# Patient Record
Sex: FEMALE | Race: WHITE
[De-identification: names, ages, dates, MRNs, and addresses within clinical notes are randomized per-mention and may not be internally consistent; named-entity substitution may affect disease eponyms.]

---

## 2018-10-25 ENCOUNTER — HOSPITAL ENCOUNTER (OUTPATIENT)
Dept: HOSPITAL 96 - M.RAD | Age: 58
End: 2018-10-25
Attending: FAMILY MEDICINE
Payer: COMMERCIAL

## 2018-10-25 DIAGNOSIS — Z12.31: Primary | ICD-10-CM

## 2019-09-03 ENCOUNTER — HOSPITAL ENCOUNTER (OUTPATIENT)
Dept: HOSPITAL 96 - M.SUR | Age: 59
Setting detail: OBSERVATION
LOS: 1 days | Discharge: HOME | End: 2019-09-04
Attending: ORTHOPAEDIC SURGERY | Admitting: ORTHOPAEDIC SURGERY
Payer: COMMERCIAL

## 2019-09-03 VITALS — DIASTOLIC BLOOD PRESSURE: 46 MMHG | SYSTOLIC BLOOD PRESSURE: 133 MMHG

## 2019-09-03 VITALS — BODY MASS INDEX: 32.13 KG/M2 | WEIGHT: 212 LBS | HEIGHT: 67.99 IN
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
## 2019-09-03 DIAGNOSIS — F41.1: ICD-10-CM
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
## 2019-09-03 DIAGNOSIS — Z79.899: ICD-10-CM

## 2019-09-03 DIAGNOSIS — F32.9: ICD-10-CM

## 2019-09-03 DIAGNOSIS — S42.202A: Primary | ICD-10-CM

## 2019-09-04 VITALS — DIASTOLIC BLOOD PRESSURE: 82 MMHG | SYSTOLIC BLOOD PRESSURE: 157 MMHG

## 2019-09-04 VITALS — DIASTOLIC BLOOD PRESSURE: 71 MMHG | SYSTOLIC BLOOD PRESSURE: 138 MMHG

## 2019-09-04 VITALS — DIASTOLIC BLOOD PRESSURE: 87 MMHG | SYSTOLIC BLOOD PRESSURE: 140 MMHG

## 2019-09-04 VITALS — SYSTOLIC BLOOD PRESSURE: 157 MMHG | DIASTOLIC BLOOD PRESSURE: 82 MMHG

## 2020-08-10 ENCOUNTER — HOSPITAL ENCOUNTER (OUTPATIENT)
Dept: HOSPITAL 96 - M.RAD | Age: 60
End: 2020-08-10
Attending: FAMILY MEDICINE
Payer: COMMERCIAL

## 2020-08-10 DIAGNOSIS — Z12.31: Primary | ICD-10-CM

## 2020-08-10 DIAGNOSIS — N64.89: ICD-10-CM

## 2020-08-12 ENCOUNTER — HOSPITAL ENCOUNTER (OUTPATIENT)
Dept: HOSPITAL 96 - M.ULTRA | Age: 60
End: 2020-08-12
Attending: FAMILY MEDICINE
Payer: COMMERCIAL

## 2020-08-12 DIAGNOSIS — N63.22: Primary | ICD-10-CM

## 2020-08-18 ENCOUNTER — HOSPITAL ENCOUNTER (OUTPATIENT)
Dept: HOSPITAL 96 - M.ULTRA | Age: 60
End: 2020-08-18
Attending: FAMILY MEDICINE
Payer: COMMERCIAL

## 2020-08-18 DIAGNOSIS — N63.22: Primary | ICD-10-CM

## 2021-10-29 ENCOUNTER — HOSPITAL ENCOUNTER (INPATIENT)
Dept: HOSPITAL 96 - M.ERS | Age: 61
LOS: 5 days | Discharge: HOME | DRG: 208 | End: 2021-11-03
Attending: INTERNAL MEDICINE | Admitting: INTERNAL MEDICINE
Payer: COMMERCIAL

## 2021-10-29 VITALS — DIASTOLIC BLOOD PRESSURE: 72 MMHG | SYSTOLIC BLOOD PRESSURE: 134 MMHG

## 2021-10-29 VITALS — DIASTOLIC BLOOD PRESSURE: 76 MMHG | SYSTOLIC BLOOD PRESSURE: 127 MMHG

## 2021-10-29 VITALS — SYSTOLIC BLOOD PRESSURE: 171 MMHG | DIASTOLIC BLOOD PRESSURE: 81 MMHG

## 2021-10-29 VITALS — WEIGHT: 232 LBS | BODY MASS INDEX: 37.28 KG/M2 | HEIGHT: 65.98 IN

## 2021-10-29 VITALS — DIASTOLIC BLOOD PRESSURE: 84 MMHG | SYSTOLIC BLOOD PRESSURE: 142 MMHG

## 2021-10-29 DIAGNOSIS — Z20.822: ICD-10-CM

## 2021-10-29 DIAGNOSIS — F32.9: ICD-10-CM

## 2021-10-29 DIAGNOSIS — T78.3XXA: ICD-10-CM

## 2021-10-29 DIAGNOSIS — J96.01: Primary | ICD-10-CM

## 2021-10-29 DIAGNOSIS — Z85.3: ICD-10-CM

## 2021-10-29 DIAGNOSIS — Z79.899: ICD-10-CM

## 2021-10-29 DIAGNOSIS — F41.1: ICD-10-CM

## 2021-10-29 DIAGNOSIS — T78.40XA: ICD-10-CM

## 2021-10-29 DIAGNOSIS — R73.9: ICD-10-CM

## 2021-10-29 LAB
ABSOLUTE MONOCYTES: 0.3 THOU/UL (ref 0–1.2)
ANION GAP SERPL CALC-SCNC: 9 MMOL/L (ref 7–16)
BE: -5.7 MMOL/L
BUN SERPL-MCNC: 14 MG/DL (ref 7–18)
CALCIUM SERPL-MCNC: 8.3 MG/DL (ref 8.5–10.1)
CHLORIDE SERPL-SCNC: 103 MMOL/L (ref 98–107)
CO2 SERPL-SCNC: 23 MMOL/L (ref 21–32)
CREAT SERPL-MCNC: 0.8 MG/DL (ref 0.6–1.3)
GLUCOSE SERPL-MCNC: 150 MG/DL (ref 70–99)
GRANULOCYTES NFR BLD MANUAL: 86 %
HCT VFR BLD CALC: 41.1 % (ref 37–47)
HGB BLD-MCNC: 13.6 GM/DL (ref 12–15)
LYMPHOCYTES # BLD: 0.9 THOU/UL (ref 0.8–5.3)
LYMPHOCYTES NFR BLD AUTO: 6 %
MCH RBC QN AUTO: 28.3 PG (ref 26–34)
MCHC RBC AUTO-ENTMCNC: 33 G/DL (ref 28–37)
MCV RBC: 85.9 FL (ref 80–100)
MONOCYTES NFR BLD: 3 %
MPV: 7.7 FL. (ref 7.2–11.1)
NEUTROPHILS # BLD: 9.9 THOU/UL (ref 1.6–8.1)
NEUTS BAND NFR BLD: 3 %
NUCLEATED RBCS: 0 /100WBC
PCO2 BLD: 32.5 MMHG (ref 35–45)
PLATELET # BLD EST: ADEQUATE 10*3/UL
PLATELET COUNT*: 342 THOU/UL (ref 150–400)
PO2 BLD: 135 MMHG (ref 75–100)
POTASSIUM SERPL-SCNC: 3.8 MMOL/L (ref 3.5–5.1)
RBC # BLD AUTO: 4.78 MIL/UL (ref 4.2–5)
RBC MORPH BLD: NORMAL
RDW-CV: 14.9 % (ref 10.5–14.5)
SODIUM SERPL-SCNC: 135 MMOL/L (ref 136–145)
VARIANT LYMPHS NFR BLD MANUAL: 2 %
WBC # BLD AUTO: 11.1 THOU/UL (ref 4–11)

## 2021-10-29 PROCEDURE — 0BH17EZ INSERTION OF ENDOTRACHEAL AIRWAY INTO TRACHEA, VIA NATURAL OR ARTIFICIAL OPENING: ICD-10-PCS | Performed by: INTERNAL MEDICINE

## 2021-10-29 PROCEDURE — 5A1945Z RESPIRATORY VENTILATION, 24-96 CONSECUTIVE HOURS: ICD-10-PCS | Performed by: INTERNAL MEDICINE

## 2021-10-30 VITALS — SYSTOLIC BLOOD PRESSURE: 131 MMHG | DIASTOLIC BLOOD PRESSURE: 79 MMHG

## 2021-10-30 VITALS — DIASTOLIC BLOOD PRESSURE: 56 MMHG | SYSTOLIC BLOOD PRESSURE: 105 MMHG

## 2021-10-30 VITALS — SYSTOLIC BLOOD PRESSURE: 123 MMHG | DIASTOLIC BLOOD PRESSURE: 65 MMHG

## 2021-10-30 VITALS — SYSTOLIC BLOOD PRESSURE: 145 MMHG | DIASTOLIC BLOOD PRESSURE: 75 MMHG

## 2021-10-30 VITALS — DIASTOLIC BLOOD PRESSURE: 80 MMHG | SYSTOLIC BLOOD PRESSURE: 134 MMHG

## 2021-10-30 VITALS — DIASTOLIC BLOOD PRESSURE: 67 MMHG | SYSTOLIC BLOOD PRESSURE: 119 MMHG

## 2021-10-30 VITALS — DIASTOLIC BLOOD PRESSURE: 73 MMHG | SYSTOLIC BLOOD PRESSURE: 135 MMHG

## 2021-10-30 VITALS — DIASTOLIC BLOOD PRESSURE: 65 MMHG | SYSTOLIC BLOOD PRESSURE: 122 MMHG

## 2021-10-30 VITALS — DIASTOLIC BLOOD PRESSURE: 79 MMHG | SYSTOLIC BLOOD PRESSURE: 127 MMHG

## 2021-10-30 VITALS — DIASTOLIC BLOOD PRESSURE: 70 MMHG | SYSTOLIC BLOOD PRESSURE: 130 MMHG

## 2021-10-30 VITALS — DIASTOLIC BLOOD PRESSURE: 76 MMHG | SYSTOLIC BLOOD PRESSURE: 142 MMHG

## 2021-10-30 VITALS — DIASTOLIC BLOOD PRESSURE: 64 MMHG | SYSTOLIC BLOOD PRESSURE: 113 MMHG

## 2021-10-30 VITALS — SYSTOLIC BLOOD PRESSURE: 141 MMHG | DIASTOLIC BLOOD PRESSURE: 83 MMHG

## 2021-10-30 VITALS — DIASTOLIC BLOOD PRESSURE: 72 MMHG | SYSTOLIC BLOOD PRESSURE: 129 MMHG

## 2021-10-30 VITALS — SYSTOLIC BLOOD PRESSURE: 109 MMHG | DIASTOLIC BLOOD PRESSURE: 54 MMHG

## 2021-10-30 VITALS — SYSTOLIC BLOOD PRESSURE: 137 MMHG | DIASTOLIC BLOOD PRESSURE: 69 MMHG

## 2021-10-30 VITALS — SYSTOLIC BLOOD PRESSURE: 129 MMHG | DIASTOLIC BLOOD PRESSURE: 65 MMHG

## 2021-10-30 VITALS — DIASTOLIC BLOOD PRESSURE: 69 MMHG | SYSTOLIC BLOOD PRESSURE: 122 MMHG

## 2021-10-30 VITALS — DIASTOLIC BLOOD PRESSURE: 77 MMHG | SYSTOLIC BLOOD PRESSURE: 139 MMHG

## 2021-10-30 VITALS — SYSTOLIC BLOOD PRESSURE: 133 MMHG | DIASTOLIC BLOOD PRESSURE: 79 MMHG

## 2021-10-30 VITALS — SYSTOLIC BLOOD PRESSURE: 129 MMHG | DIASTOLIC BLOOD PRESSURE: 69 MMHG

## 2021-10-30 VITALS — DIASTOLIC BLOOD PRESSURE: 77 MMHG | SYSTOLIC BLOOD PRESSURE: 131 MMHG

## 2021-10-30 VITALS — SYSTOLIC BLOOD PRESSURE: 118 MMHG | DIASTOLIC BLOOD PRESSURE: 63 MMHG

## 2021-10-30 VITALS — SYSTOLIC BLOOD PRESSURE: 139 MMHG | DIASTOLIC BLOOD PRESSURE: 80 MMHG

## 2021-10-30 LAB
BE: -1.9 MMOL/L
PCO2 BLD: 37.6 MMHG (ref 35–45)
PO2 BLD: 96 MMHG (ref 75–100)

## 2021-10-30 NOTE — CON
[address]  [address]                    CONSULTATION                  
_______________________________________________________________________________
 
Name:       DEE ENGLISH                Room:           [address] [place]  
M.R.#:  [id number]      Account #:      [id number]  
Admission:  10/29/21     Attend Phys:    Abhay Cartagena MD 
Discharge:               Date of Birth:  [birth year]  
         Report #: [id number]
                                                                     [id number]
_______________________________________________________________________________
THIS REPORT FOR:  
 
cc:  Alesia Collins Maggie M. DO Pervez, Adeel MD                                                   ~
 
 
DATE OF CONSULTATION: 10/30/2021
 
REQUESTING PHYSICIAN:  Consult has been requested by Dr. Brar.
 
INDICATION FOR CONSULTATION:  Ventilator management.
 
HISTORY OF PRESENT ILLNESS:  A 61-year-old female has a history of breast 
cancer.  She also has had significant anxiety and depression in the past and 
significant pain issues with left shoulder and humerus injuries; however, the 
patient is not reported to be currently on narcotics.  The patient does not have
a history of longstanding cardiac or respiratory disease.  There is no known use
of ACE inhibitors.  The patient yesterday was having dinner, was eating [employer] 
[employer]  with French fries and was also having a beer, which she was sharing
with her .  She has had the same on many occasions previously according 
to the  with no adverse reactions. At this time, the patient's  
reports that her tongue suddenly started to swell up and she became suddenly 
very short of breath as a result. The patient eventually was endotracheally 
intubated for airway protection.  There is no definite history of aspiration 
noted.
 
Currently, the patient is stable on the ventilator, 35% FiO2, 5 of PEEP.  She is
sedated with 50 of propofol as well as 4 of Versed and she still does not appear
to be adequately sedated, though. She is maintaining her normal blood pressure, 
but she is tachycardic.  Her T-max is normal at 37.0.  The patient is on the 
ventilator and therefore she is not able to provide a further history or review 
of systems.
 
PAST MEDICAL HISTORY:  Breast cancer surgery over a year ago in the left breast.
 The patient's  states that his impression was that this was stage 1.  
There were some lymph nodes sampled. It is not known to me as to whether there 
was any spread or not, longstanding pain issues with a history of left shoulder 
and left humerus injury, has used narcotics for pain in the past, but is not 
reported to be currently on any narcotics, generalized anxiety disorder, and 
major depressive disorder.
 
SOCIAL HISTORY:  She has smoked in the past for about 15 years, discontinued 
many years ago.  Only occasional alcohol use.
 
IMMUNIZATION HISTORY:  She received 2 doses of Moderna COVID-19 vaccine a few 
 
 
 
[place] [address]                    CONSULTATION                  
_______________________________________________________________________________
 
Name:       AMADORDEE                Room:           [address] [place]  
..#:  [id number]      Account #:      [id number]  
Admission:  10/29/21     Attend Phys:    Abhay Cartagena MD 
Discharge:               Date of Birth:  [birth year]  
         Report #: [id number]
                                                                     [id number]
_______________________________________________________________________________
 
 
months ago.
 
ALLERGIES:  No known drug allergies.
 
FAMILY HISTORY:  No pertinent family history.
 
CURRENT MEDICATIONS:  The list in [facility] reviewed.
 
HOME MEDICATIONS:  Also the list in [facility] reviewed.
 
PHYSICAL EXAMINATION:
GENERAL:  She is sedated with propofol and Versed, still appears to be restless.
 She is tachycardic.
VITAL SIGNS:  Heart rate is 115, blood pressure 140/80, temperature 37.0, 
saturating 97%, tidal volume 500, AC rate of 14.  She is overbreathing with the 
ventilator, 35% FiO2, 5 of PEEP.
HEENT:  Head is normocephalic and atraumatic.  There is an endotracheal tube in 
good position.  There is some swelling of her tongue and mouth noted.  It is, 
however, difficult to fully assess due to the presence of the endotracheal tube.
NECK:  Does not show raised JVP, asymmetry, mass or lymph nodes.  It is 
difficult to assess as to whether there is any neck swelling.
CHEST:  Breath sounds are bilaterally equal.  I do not hear any added sounds.
HEART:  Regular.  There is tachycardia noted.
ABDOMEN:  Soft and nontender.
EXTREMITIES:  Lower extremities show no edema and no calf tenderness.
SKIN:  Dry and intact.
NEUROLOGIC:  She moves all extremities bilaterally equally and spontaneously 
with no focal deficit identified.
 
DIAGNOSTIC DATA:  The patient's chest x-ray reviewed.  There is a radiopaque 
density at the left lung base, which could be chronic change, infiltrate or 
atelectasis.  I do not have a previous chest x-ray available to compare; 
therefore, I would for now treat this as an infiltrate.  The patient's lab work 
is in [facility].  This is reviewed.  The arterial blood gases also in [facility] 
and this is also reviewed.
 
ASSESSMENT AND PLAN:
1.  Acute respiratory failure secondary to upper airway edema.  I would be 
inclined to wait at least 48 hours before doing a leak test and seeing as to 
whether we are able to take her off the ventilator.  In the meantime, I 
recommend that we continue with propofol.  We will follow propofol related labs,
okay to go up to 80 on the propofol for a short period of time, but for a longer
period of time, we will try to limit at 50 or less. For now, we will also 
continue with the Versed drip, but I will go ahead and start a fentanyl drip and
 
 
 
[place]  [address]                    CONSULTATION                  
_______________________________________________________________________________
 
Name:       DEE ENGLISH                Room:           [address] [place]  
M.R.#:  [id number]      Account #:      [id number]  
Admission:  10/29/21     Attend Phys:    Abhay Cartagena MD 
Discharge:               Date of Birth:  [birth year]  
         Report #: [id number]
                                                                     [id number]
_______________________________________________________________________________
 
 
then we will see if we can bring the Versed drip down.  She is tachycardic.  We 
will watch this closely.  Tachycardia, likely is due to restlessness/agitation. 
The patient also has had longstanding pain issues as above.
2.  Tongue swelling/upper airway edema. I started with high-dose Solu-Medrol, 
Benadryl as well as Pepcid.  We will follow response.  We will verify that she 
passes a leak test whenever we do try to extubate her later.
3.  Pulmonary infiltrates.  There is an infiltrate at the left lung base, 
chronic changes and atelectasis can also lead to this picture; however, I do not
have a previous chest x-ray available to compare; therefore, I would cover with 
ceftriaxone.
4.  History of breast cancer.
5.  History of longstanding left shoulder pain, not known to be currently on 
narcotics.
6.  Mild hyperglycemia.  We will put her on insulin sliding scale while she is 
on high dose steroid.
7.  Deep vein thrombosis prophylaxis.  Agree with Lovenox.
8.  Gastrointestinal prophylaxis. She is on Pepcid as above.
9.  Clostridium difficile prophylaxis.  We will order Lactinex.
10.  Nutrition.  I expect her to be on the ventilator a couple of days.  
Therefore, I will go ahead and start tube feeds.  We will discontinue IV fluids 
when tube feeds started.
 
The patient is critically ill at this time.
 
Total time spent providing critical care to this patient today exceeds 40 
minutes.
 
 
 
 
 
 
 
 
 
 
 
 
 
 
 
 
<ELECTRONICALLY SIGNED>
                                        By:  Chuck Hopper MD              
10/30/21     1852
D: 10/30/21 1146_______________________________________
T: 10/30/21 1416Ajose Hopper MD                 /nt

## 2021-10-30 NOTE — NUR
1140 DR. JIN ROUNDED. SEDATION LEVEL AND POC DISCUSSED. NO CHNAGES TO BE
MADE TODAY, JUST CONTINUE TO MONITOR AND ALLOW THE SWELLING TO COME DOWN.

## 2021-10-31 VITALS — SYSTOLIC BLOOD PRESSURE: 94 MMHG | DIASTOLIC BLOOD PRESSURE: 46 MMHG

## 2021-10-31 VITALS — SYSTOLIC BLOOD PRESSURE: 92 MMHG | DIASTOLIC BLOOD PRESSURE: 51 MMHG

## 2021-10-31 VITALS — SYSTOLIC BLOOD PRESSURE: 84 MMHG | DIASTOLIC BLOOD PRESSURE: 40 MMHG

## 2021-10-31 VITALS — SYSTOLIC BLOOD PRESSURE: 106 MMHG | DIASTOLIC BLOOD PRESSURE: 57 MMHG

## 2021-10-31 VITALS — SYSTOLIC BLOOD PRESSURE: 105 MMHG | DIASTOLIC BLOOD PRESSURE: 52 MMHG

## 2021-10-31 VITALS — DIASTOLIC BLOOD PRESSURE: 50 MMHG | SYSTOLIC BLOOD PRESSURE: 105 MMHG

## 2021-10-31 VITALS — DIASTOLIC BLOOD PRESSURE: 53 MMHG | SYSTOLIC BLOOD PRESSURE: 99 MMHG

## 2021-10-31 VITALS — SYSTOLIC BLOOD PRESSURE: 88 MMHG | DIASTOLIC BLOOD PRESSURE: 42 MMHG

## 2021-10-31 VITALS — SYSTOLIC BLOOD PRESSURE: 104 MMHG | DIASTOLIC BLOOD PRESSURE: 49 MMHG

## 2021-10-31 VITALS — SYSTOLIC BLOOD PRESSURE: 108 MMHG | DIASTOLIC BLOOD PRESSURE: 56 MMHG

## 2021-10-31 VITALS — SYSTOLIC BLOOD PRESSURE: 86 MMHG | DIASTOLIC BLOOD PRESSURE: 46 MMHG

## 2021-10-31 VITALS — SYSTOLIC BLOOD PRESSURE: 102 MMHG | DIASTOLIC BLOOD PRESSURE: 48 MMHG

## 2021-10-31 VITALS — DIASTOLIC BLOOD PRESSURE: 47 MMHG | SYSTOLIC BLOOD PRESSURE: 93 MMHG

## 2021-10-31 VITALS — DIASTOLIC BLOOD PRESSURE: 74 MMHG | SYSTOLIC BLOOD PRESSURE: 121 MMHG

## 2021-10-31 VITALS — DIASTOLIC BLOOD PRESSURE: 50 MMHG | SYSTOLIC BLOOD PRESSURE: 98 MMHG

## 2021-10-31 VITALS — DIASTOLIC BLOOD PRESSURE: 55 MMHG | SYSTOLIC BLOOD PRESSURE: 115 MMHG

## 2021-10-31 VITALS — DIASTOLIC BLOOD PRESSURE: 53 MMHG | SYSTOLIC BLOOD PRESSURE: 107 MMHG

## 2021-10-31 VITALS — DIASTOLIC BLOOD PRESSURE: 59 MMHG | SYSTOLIC BLOOD PRESSURE: 124 MMHG

## 2021-10-31 VITALS — SYSTOLIC BLOOD PRESSURE: 103 MMHG | DIASTOLIC BLOOD PRESSURE: 52 MMHG

## 2021-10-31 VITALS — SYSTOLIC BLOOD PRESSURE: 113 MMHG | DIASTOLIC BLOOD PRESSURE: 54 MMHG

## 2021-10-31 VITALS — DIASTOLIC BLOOD PRESSURE: 46 MMHG | SYSTOLIC BLOOD PRESSURE: 89 MMHG

## 2021-10-31 VITALS — SYSTOLIC BLOOD PRESSURE: 97 MMHG | DIASTOLIC BLOOD PRESSURE: 50 MMHG

## 2021-10-31 VITALS — DIASTOLIC BLOOD PRESSURE: 48 MMHG | SYSTOLIC BLOOD PRESSURE: 96 MMHG

## 2021-10-31 VITALS — SYSTOLIC BLOOD PRESSURE: 101 MMHG | DIASTOLIC BLOOD PRESSURE: 55 MMHG

## 2021-10-31 VITALS — SYSTOLIC BLOOD PRESSURE: 102 MMHG | DIASTOLIC BLOOD PRESSURE: 54 MMHG

## 2021-10-31 VITALS — DIASTOLIC BLOOD PRESSURE: 46 MMHG | SYSTOLIC BLOOD PRESSURE: 103 MMHG

## 2021-10-31 VITALS — DIASTOLIC BLOOD PRESSURE: 41 MMHG | SYSTOLIC BLOOD PRESSURE: 85 MMHG

## 2021-10-31 VITALS — DIASTOLIC BLOOD PRESSURE: 47 MMHG | SYSTOLIC BLOOD PRESSURE: 96 MMHG

## 2021-10-31 LAB
ABSOLUTE BASOPHILS: 0 THOU/UL (ref 0–0.2)
ABSOLUTE EOSINOPHILS: 0 THOU/UL (ref 0–0.7)
ABSOLUTE MONOCYTES: 0.4 THOU/UL (ref 0–1.2)
ALBUMIN SERPL-MCNC: 2.7 G/DL (ref 3.4–5)
ALP SERPL-CCNC: 70 U/L (ref 46–116)
ALT SERPL-CCNC: 16 U/L (ref 30–65)
ANION GAP SERPL CALC-SCNC: 7 MMOL/L (ref 7–16)
AST SERPL-CCNC: 7 U/L (ref 15–37)
BASOPHILS NFR BLD AUTO: 0.2 %
BILIRUB SERPL-MCNC: 0.1 MG/DL
BUN SERPL-MCNC: 13 MG/DL (ref 7–18)
CALCIUM SERPL-MCNC: 8.5 MG/DL (ref 8.5–10.1)
CHLORIDE SERPL-SCNC: 105 MMOL/L (ref 98–107)
CO2 SERPL-SCNC: 26 MMOL/L (ref 21–32)
CREAT SERPL-MCNC: 0.7 MG/DL (ref 0.6–1.3)
EOSINOPHIL NFR BLD: 0 %
GLUCOSE SERPL-MCNC: 185 MG/DL (ref 70–99)
GRANULOCYTES NFR BLD MANUAL: 93.1 %
HCT VFR BLD CALC: 37.6 % (ref 37–47)
HGB BLD-MCNC: 12.1 GM/DL (ref 12–15)
LIPASE: 103 U/L (ref 73–393)
LYMPHOCYTES # BLD: 0.6 THOU/UL (ref 0.8–5.3)
LYMPHOCYTES NFR BLD AUTO: 4.1 %
MAGNESIUM SERPL-MCNC: 2.5 MG/DL (ref 1.8–2.4)
MCH RBC QN AUTO: 28.1 PG (ref 26–34)
MCHC RBC AUTO-ENTMCNC: 32.2 G/DL (ref 28–37)
MCV RBC: 87.3 FL (ref 80–100)
MONOCYTES NFR BLD: 2.6 %
MPV: 7.8 FL. (ref 7.2–11.1)
NEUTROPHILS # BLD: 14.3 THOU/UL (ref 1.6–8.1)
NUCLEATED RBCS: 0 /100WBC
PHOSPHATE SERPL-MCNC: 2.7 MG/DL (ref 2.5–4.9)
PLATELET COUNT*: 321 THOU/UL (ref 150–400)
POTASSIUM SERPL-SCNC: 4.6 MMOL/L (ref 3.5–5.1)
PROT SERPL-MCNC: 6.7 G/DL (ref 6.4–8.2)
RBC # BLD AUTO: 4.31 MIL/UL (ref 4.2–5)
RDW-CV: 15.1 % (ref 10.5–14.5)
SODIUM SERPL-SCNC: 138 MMOL/L (ref 136–145)
TRIGL SERPL-MCNC: 86 MG/DL (ref ?–150)
WBC # BLD AUTO: 15.3 THOU/UL (ref 4–11)

## 2021-10-31 NOTE — EKG
[place] [address]
Phone:  [phone]                     ELECTROCARDIOGRAM REPORT      
_______________________________________________________________________________
 
Name:         AMADORDEE               Room:          [address]    [place] 
M.R.#:    [id number]     Account #:     [id number]  
Admission:    10/29/21    Attend Phys:   Abhay Cartagena, 
Discharge:                Date of Birth: [birth year]  
Date of Service: 10/31/21 0455  Report #:      [id number]
        [id number]
_______________________________________________________________________________
THIS REPORT FOR:  //name//                      
 
                          [facility]
                                       
Test Date:    2021-10-31               Test Time:    04:55:40
Pat Name:     DEE ENGLISH            Department:   
Patient ID:   [id number]            Room:         [address]
Gender:       F                        Technician:   
:          1960               Requested By: Abhay Cartagena
Order Number: [id number]    Alcides MD:   Ignacio Grayson
                                 Measurements
Intervals                              Axis          
Rate:         90                       P:            67
MI:           147                      QRS:          71
QRSD:         92                       T:            53
QT:           375                                    
QTc:          459                                    
                           Interpretive Statements
Sinus rhythm
Baseline wander in lead(s) V1
No previous ECG available for comparison
Electronically Signed On 10- 12:12:55 CDT by Ignacio Grayson
[email or web address]
 
 
 
 
 
 
 
 
 
 
 
 
 
 
 
 
 
 
 
 
 
  <ELECTRONICALLY SIGNED>
                                           By: Ignacio Grayson MD, FACC   
  10/31/21     1212
D: 10/31/21 0455   _____________________________________
T: 10/31/21 0455   Ignacio Grayson MD, FACC     /EPI

## 2021-10-31 NOTE — NUR
PT REMAINED SUCCESFULLY SEDATED THROUGHOUT SHIFT. VENT AND OG INTACT. O2
MAINTAINTED. SR ON MONITOR. CELLULITIS NOTED ON LEFT BREAST AND ABOVE LEFT
SHOULDER INCISION. MARKED OUTLINE OF REDNESS WITH PERMANENT MARKER. PT
CONTINUED TO BE SEDATED THROUGHOUT BATH. NO BM THIS SHIFT. PASSING GAS. AMAN
URINE OUTPUT IN HUNT. SHOWED CELLULITIS AREAS TO DAY RN.

## 2021-10-31 NOTE — NUR
DR. JIN ROUNDED ABOUT 1100. IT WAS DECIDED TO LEAVE THE PT INTUBATED FOR
ONE MORE DAY AND TO DO A LEAK TEST TO TEST FOR EXTUBATION READINESS TOMORROW.

## 2021-11-01 VITALS — DIASTOLIC BLOOD PRESSURE: 51 MMHG | SYSTOLIC BLOOD PRESSURE: 119 MMHG

## 2021-11-01 VITALS — SYSTOLIC BLOOD PRESSURE: 115 MMHG | DIASTOLIC BLOOD PRESSURE: 59 MMHG

## 2021-11-01 VITALS — DIASTOLIC BLOOD PRESSURE: 60 MMHG | SYSTOLIC BLOOD PRESSURE: 141 MMHG

## 2021-11-01 VITALS — DIASTOLIC BLOOD PRESSURE: 50 MMHG | SYSTOLIC BLOOD PRESSURE: 102 MMHG

## 2021-11-01 VITALS — SYSTOLIC BLOOD PRESSURE: 145 MMHG | DIASTOLIC BLOOD PRESSURE: 66 MMHG

## 2021-11-01 VITALS — DIASTOLIC BLOOD PRESSURE: 55 MMHG | SYSTOLIC BLOOD PRESSURE: 105 MMHG

## 2021-11-01 VITALS — SYSTOLIC BLOOD PRESSURE: 117 MMHG | DIASTOLIC BLOOD PRESSURE: 53 MMHG

## 2021-11-01 VITALS — SYSTOLIC BLOOD PRESSURE: 119 MMHG | DIASTOLIC BLOOD PRESSURE: 55 MMHG

## 2021-11-01 VITALS — DIASTOLIC BLOOD PRESSURE: 50 MMHG | SYSTOLIC BLOOD PRESSURE: 100 MMHG

## 2021-11-01 VITALS — DIASTOLIC BLOOD PRESSURE: 63 MMHG | SYSTOLIC BLOOD PRESSURE: 124 MMHG

## 2021-11-01 VITALS — SYSTOLIC BLOOD PRESSURE: 118 MMHG | DIASTOLIC BLOOD PRESSURE: 53 MMHG

## 2021-11-01 VITALS — SYSTOLIC BLOOD PRESSURE: 129 MMHG | DIASTOLIC BLOOD PRESSURE: 60 MMHG

## 2021-11-01 VITALS — SYSTOLIC BLOOD PRESSURE: 136 MMHG | DIASTOLIC BLOOD PRESSURE: 65 MMHG

## 2021-11-01 VITALS — DIASTOLIC BLOOD PRESSURE: 63 MMHG | SYSTOLIC BLOOD PRESSURE: 137 MMHG

## 2021-11-01 VITALS — SYSTOLIC BLOOD PRESSURE: 104 MMHG | DIASTOLIC BLOOD PRESSURE: 48 MMHG

## 2021-11-01 VITALS — SYSTOLIC BLOOD PRESSURE: 121 MMHG | DIASTOLIC BLOOD PRESSURE: 59 MMHG

## 2021-11-01 VITALS — DIASTOLIC BLOOD PRESSURE: 61 MMHG | SYSTOLIC BLOOD PRESSURE: 121 MMHG

## 2021-11-01 VITALS — SYSTOLIC BLOOD PRESSURE: 118 MMHG | DIASTOLIC BLOOD PRESSURE: 56 MMHG

## 2021-11-01 VITALS — SYSTOLIC BLOOD PRESSURE: 135 MMHG | DIASTOLIC BLOOD PRESSURE: 61 MMHG

## 2021-11-01 VITALS — DIASTOLIC BLOOD PRESSURE: 47 MMHG | SYSTOLIC BLOOD PRESSURE: 105 MMHG

## 2021-11-01 LAB
ABSOLUTE BASOPHILS: 0 THOU/UL (ref 0–0.2)
ABSOLUTE EOSINOPHILS: 0 THOU/UL (ref 0–0.7)
ABSOLUTE MONOCYTES: 0.5 THOU/UL (ref 0–1.2)
ANION GAP SERPL CALC-SCNC: 7 MMOL/L (ref 7–16)
BASOPHILS NFR BLD AUTO: 0 %
BUN SERPL-MCNC: 20 MG/DL (ref 7–18)
CALCIUM SERPL-MCNC: 8.5 MG/DL (ref 8.5–10.1)
CHLORIDE SERPL-SCNC: 104 MMOL/L (ref 98–107)
CO2 SERPL-SCNC: 28 MMOL/L (ref 21–32)
CREAT SERPL-MCNC: 0.8 MG/DL (ref 0.6–1.3)
EOSINOPHIL NFR BLD: 0 %
GLUCOSE SERPL-MCNC: 142 MG/DL (ref 70–99)
GRANULOCYTES NFR BLD MANUAL: 93.4 %
HCT VFR BLD CALC: 36.7 % (ref 37–47)
HGB BLD-MCNC: 11.9 GM/DL (ref 12–15)
LYMPHOCYTES # BLD: 0.5 THOU/UL (ref 0.8–5.3)
LYMPHOCYTES NFR BLD AUTO: 3.4 %
MAGNESIUM SERPL-MCNC: 2.6 MG/DL (ref 1.8–2.4)
MCH RBC QN AUTO: 28.3 PG (ref 26–34)
MCHC RBC AUTO-ENTMCNC: 32.5 G/DL (ref 28–37)
MCV RBC: 87.1 FL (ref 80–100)
MONOCYTES NFR BLD: 3.2 %
MPV: 8.1 FL. (ref 7.2–11.1)
NEUTROPHILS # BLD: 14.7 THOU/UL (ref 1.6–8.1)
NUCLEATED RBCS: 0 /100WBC
PLATELET COUNT*: 292 THOU/UL (ref 150–400)
POTASSIUM SERPL-SCNC: 4.8 MMOL/L (ref 3.5–5.1)
RBC # BLD AUTO: 4.21 MIL/UL (ref 4.2–5)
RDW-CV: 15.3 % (ref 10.5–14.5)
SODIUM SERPL-SCNC: 139 MMOL/L (ref 136–145)
WBC # BLD AUTO: 15.7 THOU/UL (ref 4–11)

## 2021-11-01 NOTE — NUR
Case Management Followup
 Consultation with providers indicated pt is not yet medically clear to
discharge. Pt is on a vent, but may be extubated today. Pt to undergo swallow
evaluation. Continue plan per physician orders. CM to continue to follow pt
for discharge planning.

## 2021-11-01 NOTE — NUR
CM Assessment  - Pending
 
CM unable to conduct assessment with pt as she is intubated. CM attempted to
conduct assessment with authorized contact, but was not able to reach him. CM
to attempt at later time.

## 2021-11-02 VITALS — DIASTOLIC BLOOD PRESSURE: 61 MMHG | SYSTOLIC BLOOD PRESSURE: 115 MMHG

## 2021-11-02 VITALS — SYSTOLIC BLOOD PRESSURE: 144 MMHG | DIASTOLIC BLOOD PRESSURE: 74 MMHG

## 2021-11-02 VITALS — SYSTOLIC BLOOD PRESSURE: 129 MMHG | DIASTOLIC BLOOD PRESSURE: 64 MMHG

## 2021-11-02 VITALS — DIASTOLIC BLOOD PRESSURE: 74 MMHG | SYSTOLIC BLOOD PRESSURE: 151 MMHG

## 2021-11-02 VITALS — DIASTOLIC BLOOD PRESSURE: 60 MMHG | SYSTOLIC BLOOD PRESSURE: 121 MMHG

## 2021-11-02 VITALS — DIASTOLIC BLOOD PRESSURE: 65 MMHG | SYSTOLIC BLOOD PRESSURE: 135 MMHG

## 2021-11-02 VITALS — SYSTOLIC BLOOD PRESSURE: 124 MMHG | DIASTOLIC BLOOD PRESSURE: 74 MMHG

## 2021-11-02 VITALS — DIASTOLIC BLOOD PRESSURE: 61 MMHG | SYSTOLIC BLOOD PRESSURE: 138 MMHG

## 2021-11-02 VITALS — SYSTOLIC BLOOD PRESSURE: 133 MMHG | DIASTOLIC BLOOD PRESSURE: 64 MMHG

## 2021-11-02 VITALS — SYSTOLIC BLOOD PRESSURE: 142 MMHG | DIASTOLIC BLOOD PRESSURE: 70 MMHG

## 2021-11-02 VITALS — SYSTOLIC BLOOD PRESSURE: 128 MMHG | DIASTOLIC BLOOD PRESSURE: 54 MMHG

## 2021-11-02 VITALS — DIASTOLIC BLOOD PRESSURE: 64 MMHG | SYSTOLIC BLOOD PRESSURE: 124 MMHG

## 2021-11-02 VITALS — DIASTOLIC BLOOD PRESSURE: 68 MMHG | SYSTOLIC BLOOD PRESSURE: 133 MMHG

## 2021-11-02 VITALS — SYSTOLIC BLOOD PRESSURE: 132 MMHG | DIASTOLIC BLOOD PRESSURE: 61 MMHG

## 2021-11-02 VITALS — DIASTOLIC BLOOD PRESSURE: 65 MMHG | SYSTOLIC BLOOD PRESSURE: 142 MMHG

## 2021-11-02 VITALS — DIASTOLIC BLOOD PRESSURE: 61 MMHG | SYSTOLIC BLOOD PRESSURE: 129 MMHG

## 2021-11-02 VITALS — SYSTOLIC BLOOD PRESSURE: 120 MMHG | DIASTOLIC BLOOD PRESSURE: 61 MMHG

## 2021-11-02 VITALS — SYSTOLIC BLOOD PRESSURE: 152 MMHG | DIASTOLIC BLOOD PRESSURE: 83 MMHG

## 2021-11-02 LAB
ANION GAP SERPL CALC-SCNC: 5 MMOL/L (ref 7–16)
BUN SERPL-MCNC: 21 MG/DL (ref 7–18)
CALCIUM SERPL-MCNC: 8.8 MG/DL (ref 8.5–10.1)
CHLORIDE SERPL-SCNC: 102 MMOL/L (ref 98–107)
CO2 SERPL-SCNC: 31 MMOL/L (ref 21–32)
CREAT SERPL-MCNC: 0.7 MG/DL (ref 0.6–1.3)
GLUCOSE SERPL-MCNC: 125 MG/DL (ref 70–99)
HCT VFR BLD CALC: 38.1 % (ref 37–47)
HGB BLD-MCNC: 12.3 GM/DL (ref 12–15)
MAGNESIUM SERPL-MCNC: 2.6 MG/DL (ref 1.8–2.4)
MCH RBC QN AUTO: 27.8 PG (ref 26–34)
MCHC RBC AUTO-ENTMCNC: 32.2 G/DL (ref 28–37)
MCV RBC: 86.6 FL (ref 80–100)
MPV: 8.5 FL. (ref 7.2–11.1)
PLATELET COUNT*: 318 THOU/UL (ref 150–400)
POTASSIUM SERPL-SCNC: 4.3 MMOL/L (ref 3.5–5.1)
RBC # BLD AUTO: 4.4 MIL/UL (ref 4.2–5)
RDW-CV: 15.2 % (ref 10.5–14.5)
SODIUM SERPL-SCNC: 138 MMOL/L (ref 136–145)
WBC # BLD AUTO: 13.4 THOU/UL (ref 4–11)

## 2021-11-02 PROCEDURE — 5A0935A ASSISTANCE WITH RESPIRATORY VENTILATION, LESS THAN 24 CONSECUTIVE HOURS, HIGH NASAL FLOW/VELOCITY: ICD-10-PCS | Performed by: INTERNAL MEDICINE

## 2021-11-02 NOTE — NUR
Met with patient and  at bedside. Introduced role of CM. Patient
currently lives at home in a house with her . There are 12 stairs in
the home and patient has no difficulty using stairs. Patient was independent
with adls and was driving. No hx of DME, SNF/Rehab, HH, dialysis, BHS or
infusion therapy. PCP Is Alesia Collins. Patient feels safe to return home at
this time.
 
Plan of care: Patient was extubated yesterday and is currently on 3L NC.
Patient to downgrade to tele today. No needs anticipated at MT.
 
CM to continue to follow

## 2021-11-03 VITALS — DIASTOLIC BLOOD PRESSURE: 89 MMHG | SYSTOLIC BLOOD PRESSURE: 155 MMHG

## 2021-11-03 VITALS — SYSTOLIC BLOOD PRESSURE: 149 MMHG | DIASTOLIC BLOOD PRESSURE: 76 MMHG

## 2021-11-03 VITALS — SYSTOLIC BLOOD PRESSURE: 155 MMHG | DIASTOLIC BLOOD PRESSURE: 89 MMHG

## 2021-11-03 VITALS — DIASTOLIC BLOOD PRESSURE: 79 MMHG | SYSTOLIC BLOOD PRESSURE: 134 MMHG

## 2021-11-03 LAB
ABSOLUTE EOSINOPHILS: 0.3 THOU/UL (ref 0–0.7)
ABSOLUTE MONOCYTES: 0.5 THOU/UL (ref 0–1.2)
ALBUMIN SERPL-MCNC: 2.6 G/DL (ref 3.4–5)
ALP SERPL-CCNC: 66 U/L (ref 46–116)
ALT SERPL-CCNC: 16 U/L (ref 30–65)
ANION GAP SERPL CALC-SCNC: 5 MMOL/L (ref 7–16)
AST SERPL-CCNC: 10 U/L (ref 15–37)
BILIRUB SERPL-MCNC: 0.2 MG/DL
BUN SERPL-MCNC: 24 MG/DL (ref 7–18)
CALCIUM SERPL-MCNC: 8.2 MG/DL (ref 8.5–10.1)
CHLORIDE SERPL-SCNC: 104 MMOL/L (ref 98–107)
CO2 SERPL-SCNC: 33 MMOL/L (ref 21–32)
CREAT SERPL-MCNC: 0.9 MG/DL (ref 0.6–1.3)
EOSINOPHIL NFR BLD: 3 %
GLUCOSE SERPL-MCNC: 87 MG/DL (ref 70–99)
GRANULOCYTES NFR BLD MANUAL: 56 %
HCT VFR BLD CALC: 39.8 % (ref 37–47)
HGB BLD-MCNC: 12.9 GM/DL (ref 12–15)
LYMPHOCYTES # BLD: 3 THOU/UL (ref 0.8–5.3)
LYMPHOCYTES NFR BLD AUTO: 34 %
MAGNESIUM SERPL-MCNC: 2.4 MG/DL (ref 1.8–2.4)
MCH RBC QN AUTO: 28.3 PG (ref 26–34)
MCHC RBC AUTO-ENTMCNC: 32.3 G/DL (ref 28–37)
MCV RBC: 87.5 FL (ref 80–100)
MONOCYTES NFR BLD: 6 %
MPV: 8 FL. (ref 7.2–11.1)
NEUTROPHILS # BLD: 4.8 THOU/UL (ref 1.6–8.1)
NUCLEATED RBCS: 0 /100WBC
PLATELET # BLD EST: ADEQUATE 10*3/UL
PLATELET COUNT*: 298 THOU/UL (ref 150–400)
POLYCHROMASIA BLD QL SMEAR: (no result)
POTASSIUM SERPL-SCNC: 3.9 MMOL/L (ref 3.5–5.1)
PROT SERPL-MCNC: 5.6 G/DL (ref 6.4–8.2)
RBC # BLD AUTO: 4.55 MIL/UL (ref 4.2–5)
RBC MORPH BLD: NORMAL
RDW-CV: 15.5 % (ref 10.5–14.5)
SODIUM SERPL-SCNC: 142 MMOL/L (ref 136–145)
VARIANT LYMPHS NFR BLD MANUAL: 1 %
WBC # BLD AUTO: 8.5 THOU/UL (ref 4–11)

## 2021-11-03 NOTE — NUR
pt transfer from ICU approx 2000. AO X4 denies pain or soa, she is on 2L nc
satting 96% reports no cough and lungs cta. pt has oliva with clear yellow
urine. vss pt is anxious to be up ambulating and sitting in chair. call light
in reach and bed alarm on for pt safety